# Patient Record
Sex: FEMALE
[De-identification: names, ages, dates, MRNs, and addresses within clinical notes are randomized per-mention and may not be internally consistent; named-entity substitution may affect disease eponyms.]

---

## 2018-06-05 ENCOUNTER — HOSPITAL ENCOUNTER (OUTPATIENT)
Dept: HOSPITAL 42 - SDS | Age: 78
LOS: 1 days | Discharge: HOME | End: 2018-06-06
Attending: RADIOLOGY
Payer: MEDICARE

## 2018-06-05 VITALS — BODY MASS INDEX: 26.5 KG/M2

## 2018-06-05 DIAGNOSIS — F17.200: ICD-10-CM

## 2018-06-05 DIAGNOSIS — C7A.1: Primary | ICD-10-CM

## 2018-06-05 DIAGNOSIS — J95.811: ICD-10-CM

## 2018-06-05 LAB
APTT BLD: 28.8 SECONDS (ref 25.1–36.5)
BASOPHILS # BLD AUTO: 0.05 K/MM3 (ref 0–2)
BASOPHILS NFR BLD: 0.4 % (ref 0–3)
BUN SERPL-MCNC: 13 MG/DL (ref 7–21)
CALCIUM SERPL-MCNC: 10.5 MG/DL (ref 8.4–10.5)
EOSINOPHIL # BLD: 0.1 10*3/UL (ref 0–0.7)
EOSINOPHIL NFR BLD: 0.4 % (ref 1.5–5)
ERYTHROCYTE [DISTWIDTH] IN BLOOD BY AUTOMATED COUNT: 13.5 % (ref 11.5–14.5)
GFR NON-AFRICAN AMERICAN: > 60
GRANULOCYTES # BLD: 6.96 10*3/UL (ref 1.4–6.5)
GRANULOCYTES NFR BLD: 59.3 % (ref 50–68)
HGB BLD-MCNC: 16 G/DL (ref 12–16)
INR PPP: 0.98 (ref 0.93–1.08)
LYMPHOCYTES # BLD: 3.8 10*3/UL (ref 1.2–3.4)
LYMPHOCYTES NFR BLD AUTO: 32.3 % (ref 22–35)
MCH RBC QN AUTO: 30.1 PG (ref 25–35)
MCHC RBC AUTO-ENTMCNC: 34.6 G/DL (ref 31–37)
MCV RBC AUTO: 87.2 FL (ref 80–105)
MONOCYTES # BLD AUTO: 0.9 10*3/UL (ref 0.1–0.6)
MONOCYTES NFR BLD: 7.6 % (ref 1–6)
PLATELET # BLD: 273 10^3/UL (ref 120–450)
PMV BLD AUTO: 9.5 FL (ref 7–11)
PROTHROMBIN TIME: 11.2 SECONDS (ref 9.4–12.5)
RBC # BLD AUTO: 5.31 10^6/UL (ref 3.5–6.1)
WBC # BLD AUTO: 11.7 10^3/UL (ref 4.5–11)

## 2018-06-05 PROCEDURE — 80048 BASIC METABOLIC PNL TOTAL CA: CPT

## 2018-06-05 PROCEDURE — 88305 TISSUE EXAM BY PATHOLOGIST: CPT

## 2018-06-05 PROCEDURE — 71045 X-RAY EXAM CHEST 1 VIEW: CPT

## 2018-06-05 PROCEDURE — 32555 ASPIRATE PLEURA W/ IMAGING: CPT

## 2018-06-05 PROCEDURE — 85610 PROTHROMBIN TIME: CPT

## 2018-06-05 PROCEDURE — 85730 THROMBOPLASTIN TIME PARTIAL: CPT

## 2018-06-05 PROCEDURE — 36415 COLL VENOUS BLD VENIPUNCTURE: CPT

## 2018-06-05 PROCEDURE — 85025 COMPLETE CBC W/AUTO DIFF WBC: CPT

## 2018-06-05 PROCEDURE — 77012 CT SCAN FOR NEEDLE BIOPSY: CPT

## 2018-06-05 PROCEDURE — 32405: CPT

## 2018-06-05 RX ADMIN — OXYCODONE HYDROCHLORIDE AND ACETAMINOPHEN PRN TAB: 5; 325 TABLET ORAL at 15:40

## 2018-06-05 RX ADMIN — OXYCODONE HYDROCHLORIDE AND ACETAMINOPHEN PRN TAB: 5; 325 TABLET ORAL at 21:10

## 2018-06-05 NOTE — CT
PROCEDURE:  CT-guided left chest tube placement 



HISTORY:

2 cm left upper lobe lung mass. Enlarging pneumothorax with shortness 

of breath post biopsy.  Needs chest tube. 



PHYSICIAN(S):  Hill John MD.



TECHNIQUE:

The relative risks and indications for the procedure were explained 

to the patient and her family and informed consent obtained. The 

patient was placed in a supine position on the CT scanner and 

preliminary images through the lung apices performed. This revealed a 

moderate sized left anterior pneumothorax. 



In anterior approach was selected the area prepped and draped usual 

sterile fashion. Conscious sedation and monitoring were provided 

throughout the procedure by a nurse. A left anterior approach was 

selected the area anesthetized with 1 percent xylocaine. A 19 gauge 

needle was advanced into the left pleural space and air aspirated. 

0.035 guidewire was coiled in the left pleural space.  Sequential 

dilatation was performed with subsequent placement of 10 [place] 

pigtail chest tube anteriorly.  The catheter was retracted is 

anterior chest wall.  The catheter was placed on 20 cm low continuous 

suction.  Postprocedure images demonstrated resolution of the 

pneumothorax. 



IMPRESSION:

1. CT-guided left chest tube placement as described above.

## 2018-06-05 NOTE — CT
PROCEDURE:  CT guided left upper lobe lung biopsy.



HISTORY:

Smoker. 17 mm noncalcified spiculated left upper lobe lung nodule.  

Evaluate for malignancy. 



PHYSICIAN(S):  Hill John MD.



TECHNIQUE:

The relative risks and indications of the procedure were explained to 

the patient and consent obtained. The patient was placed right 

decubitus position on the CT scanner and preliminary images through 

the upper lungs obtained. Conscious sedation and monitoring were 

provided throughout the procedure by a nurse.



There is a 17 mm irregular noncalcified nodule left upper lobe 

posteriorly.. A bladder approach was selected and the area prepped 

and draped in the usual sterile fashion. 1% Xylocaine was used to 

anesthetize the skin and soft tissues. A 19 gauge guiding needle was 

advanced into the 18 mm left upper lobe non. Its position was 

confirmed with CT. Using coaxial technique, multiple core biopsies 

were obtained. The postprocedure images show no evidence of large 

pneumothorax or significant hemorrhage..



IMPRESSION:

1. CT-guided left upper lobe lung biopsy as described above.

## 2018-06-06 VITALS
SYSTOLIC BLOOD PRESSURE: 138 MMHG | DIASTOLIC BLOOD PRESSURE: 62 MMHG | RESPIRATION RATE: 18 BRPM | OXYGEN SATURATION: 98 % | TEMPERATURE: 98.6 F | HEART RATE: 74 BPM

## 2018-06-06 RX ADMIN — OXYCODONE HYDROCHLORIDE AND ACETAMINOPHEN PRN TAB: 5; 325 TABLET ORAL at 02:51

## 2018-06-06 NOTE — RAD
HISTORY:

lt chest tube  



COMPARISON:

Earlier same day 



FINDINGS:



LUNGS:

There is a pigtail chest tube on the left.  There is no visible 

pneumothorax.



PLEURA:

No significant pleural effusion identified, no pneumothorax apparent.



CARDIOVASCULAR:

Normal.



OSSEOUS STRUCTURES:

No significant abnormalities.



VISUALIZED UPPER ABDOMEN:

Normal.



OTHER FINDINGS:

None.



IMPRESSION:

No evidence of pneumothorax.  Chest tube in place

## 2018-06-06 NOTE — RAD
HISTORY:

lt chest tube  



COMPARISON:

06/05/2018 



FINDINGS:



LUNGS:

There is a new left-sided chest tube.  There is re-expansion of the 

lung.



PLEURA:

No significant pleural effusion identified, no pneumothorax apparent.



CARDIOVASCULAR:

Normal.



OSSEOUS STRUCTURES:

No significant abnormalities.



VISUALIZED UPPER ABDOMEN:

Normal.



OTHER FINDINGS:

None.



IMPRESSION:

There is a new left-sided chest tube.  There is re-expansion of the 

lung.

## 2018-06-06 NOTE — RAD
HISTORY:

lt lung bx  



COMPARISON:

No prior. 



FINDINGS:



LUNGS:

There is a left upper lobe pneumothorax. The edge of the lung is 3 cm 

from the chest wall. A followup film shows placement of a chest tube



PLEURA:

As above



CARDIOVASCULAR:

Normal.



OSSEOUS STRUCTURES:

No significant abnormalities.



VISUALIZED UPPER ABDOMEN:

Normal.



OTHER FINDINGS:

None.



IMPRESSION:

Left apical pneumothorax.